# Patient Record
Sex: FEMALE | Race: ASIAN | NOT HISPANIC OR LATINO | URBAN - METROPOLITAN AREA
[De-identification: names, ages, dates, MRNs, and addresses within clinical notes are randomized per-mention and may not be internally consistent; named-entity substitution may affect disease eponyms.]

---

## 2024-08-09 ENCOUNTER — OFFICE VISIT (OUTPATIENT)
Dept: URBAN - METROPOLITAN AREA CLINIC 35 | Facility: CLINIC | Age: 39
End: 2024-08-09
Payer: COMMERCIAL

## 2024-08-09 ENCOUNTER — DASHBOARD ENCOUNTERS (OUTPATIENT)
Age: 39
End: 2024-08-09

## 2024-08-09 ENCOUNTER — LAB OUTSIDE AN ENCOUNTER (OUTPATIENT)
Dept: URBAN - METROPOLITAN AREA CLINIC 35 | Facility: CLINIC | Age: 39
End: 2024-08-09

## 2024-08-09 VITALS
WEIGHT: 114 LBS | HEIGHT: 63 IN | HEART RATE: 66 BPM | DIASTOLIC BLOOD PRESSURE: 56 MMHG | SYSTOLIC BLOOD PRESSURE: 94 MMHG | BODY MASS INDEX: 20.2 KG/M2 | OXYGEN SATURATION: 98 %

## 2024-08-09 DIAGNOSIS — R79.89 ELEVATED FERRITIN: ICD-10-CM

## 2024-08-09 DIAGNOSIS — E80.4 GILBERT'S SYNDROME: ICD-10-CM

## 2024-08-09 PROBLEM — 166643006: Status: ACTIVE | Noted: 2024-08-09

## 2024-08-09 PROBLEM — 27503000: Status: ACTIVE | Noted: 2024-08-09

## 2024-08-09 PROCEDURE — 99204 OFFICE O/P NEW MOD 45 MIN: CPT | Performed by: INTERNAL MEDICINE

## 2024-08-09 NOTE — HPI-ABNORMAL LABS
39 year old female patient presents today for a consultation of elevated liver enzymes. She admits any previous history of elevated liver enzymes. Most recent labs were performed 06/17/2024, ordered by Dr. Radha Campos , with finding listed below. Patient presents for abnormal labs . Patient denies any associated symptoms .      Outside Labs  (06/17/2024) CBC: All values within normal range  CMP: CO2 20 L , Bilirubin Total 1.9 H All other values within normal range .  Iron Total 173 H Sat % 51 H  Ferritin 399 H  TSH 1.69 Normal  (06/29/2023)  Bilirubin total 1.7 H Bilirubin Direct 0.3 H Bilirubin Indirect 1.4 H Ferritin 204 H [facility] Choice Screen  NEG

## 2024-08-26 LAB — HEREDITARY HEMOCHROMATOSIS DNA MUT: (no result)

## 2024-09-04 ENCOUNTER — WEB ENCOUNTER (OUTPATIENT)
Dept: URBAN - METROPOLITAN AREA CLINIC 35 | Facility: CLINIC | Age: 39
End: 2024-09-04

## 2024-09-06 ENCOUNTER — OFFICE VISIT (OUTPATIENT)
Dept: URBAN - METROPOLITAN AREA CLINIC 35 | Facility: CLINIC | Age: 39
End: 2024-09-06

## 2024-09-06 NOTE — HPI-ABNORMAL LABS
Patient presents for follow up of labs and imaging . pt had labs and u/s completed since last visit with results documented below . Patient admits /denies any associated symptoms .  Abdominal U/S (08.21.24) IMPRESSION: 1. Echogenic right hepatic lobe lesion measuring 3.3 cm in diameter and heterogeneous appearing left hepatic lobe lesion measuring 2.8 cm. Findings are nonspecific, correlate with abdominal MRI with and without contrast for further characterization. 2. No evidence of biliary obstruction, the gallbladder is normal, the CBD is not dilated.     Of last visit (08/09/2024) 39 year old female patient presents today for a consultation of elevated liver enzymes. She admits any previous history of elevated liver enzymes. Most recent labs were performed 06/17/2024, ordered by Dr. Radha Campos , with finding listed below. Patient presents for abnormal labs . Patient denies any associated symptoms .      Outside Labs  (06/17/2024) CBC: All values within normal range  CMP: CO2 20 L , Bilirubin Total 1.9 H All other values within normal range .  Iron Total 173 H Sat % 51 H  Ferritin 399 H  TSH 1.69 Normal  (06/29/2023)  Bilirubin total 1.7 H Bilirubin Direct 0.3 H Bilirubin Indirect 1.4 H Ferritin 204 H [facility] Choice Screen  NEG

## 2024-09-11 ENCOUNTER — LAB OUTSIDE AN ENCOUNTER (OUTPATIENT)
Dept: URBAN - METROPOLITAN AREA CLINIC 35 | Facility: CLINIC | Age: 39
End: 2024-09-11

## 2024-09-11 ENCOUNTER — OFFICE VISIT (OUTPATIENT)
Dept: URBAN - METROPOLITAN AREA CLINIC 35 | Facility: CLINIC | Age: 39
End: 2024-09-11
Payer: COMMERCIAL

## 2024-09-11 VITALS
BODY MASS INDEX: 20.55 KG/M2 | WEIGHT: 116 LBS | OXYGEN SATURATION: 98 % | DIASTOLIC BLOOD PRESSURE: 58 MMHG | HEART RATE: 69 BPM | HEIGHT: 63 IN | SYSTOLIC BLOOD PRESSURE: 100 MMHG

## 2024-09-11 DIAGNOSIS — R93.2 ABNORMAL LIVER ULTRASOUND: ICD-10-CM

## 2024-09-11 DIAGNOSIS — E80.4 GILBERT'S SYNDROME: ICD-10-CM

## 2024-09-11 PROCEDURE — 99213 OFFICE O/P EST LOW 20 MIN: CPT | Performed by: INTERNAL MEDICINE

## 2024-09-11 RX ORDER — MINOXIDIL 2.5 MG/1
1 TABLET TABLET ORAL TWICE A DAY
Status: ACTIVE | COMMUNITY

## 2024-09-11 NOTE — HPI-ABNORMAL LABS
Patient presents for follow up of labs and imaging . pt had labs and u/s completed since last visit with results documented below . Patient denies any associated symptoms .  Pt denies any new concerns   Abdominal U/S (08.21.24)  IMPRESSION: 1. Echogenic right hepatic lobe lesion measuring 3.3 cm in diameter and heterogeneous appearing left hepatic lobe lesion measuring 2.8 cm. Findings are nonspecific, correlate with abdominal MRI with and without contrast for further characterization. 2. No evidence of biliary obstruction, the gallbladder is normal, the CBD is not dilated.     Of last visit (08/09/2024) 39 year old female patient presents today for a consultation of elevated liver enzymes. She admits any previous history of elevated liver enzymes. Most recent labs were performed 06/17/2024, ordered by Dr. Radha Campos , with finding listed below. Patient presents for abnormal labs . Patient denies any associated symptoms .      Outside Labs  (06/17/2024) CBC: All values within normal range  CMP: CO2 20 L , Bilirubin Total 1.9 H All other values within normal range .  Iron Total 173 H Sat % 51 H  Ferritin 399 H  TSH 1.69 Normal HFE NEGATIVE (06/29/2023)  Bilirubin total 1.7 H Bilirubin Direct 0.3 H Bilirubin Indirect 1.4 H Ferritin 204 H [facility] Choice Screen  NEG

## 2024-10-04 ENCOUNTER — OFFICE VISIT (OUTPATIENT)
Dept: URBAN - METROPOLITAN AREA CLINIC 35 | Facility: CLINIC | Age: 39
End: 2024-10-04

## 2024-10-11 ENCOUNTER — LAB OUTSIDE AN ENCOUNTER (OUTPATIENT)
Dept: URBAN - METROPOLITAN AREA CLINIC 35 | Facility: CLINIC | Age: 39
End: 2024-10-11

## 2024-10-11 ENCOUNTER — OFFICE VISIT (OUTPATIENT)
Dept: URBAN - METROPOLITAN AREA CLINIC 35 | Facility: CLINIC | Age: 39
End: 2024-10-11
Payer: COMMERCIAL

## 2024-10-11 VITALS
WEIGHT: 117 LBS | HEART RATE: 76 BPM | HEIGHT: 63 IN | SYSTOLIC BLOOD PRESSURE: 104 MMHG | OXYGEN SATURATION: 99 % | DIASTOLIC BLOOD PRESSURE: 66 MMHG | BODY MASS INDEX: 20.73 KG/M2

## 2024-10-11 DIAGNOSIS — R93.2 ABNORMAL FINDING ON IMAGING OF LIVER: ICD-10-CM

## 2024-10-11 PROBLEM — 442684004: Status: ACTIVE | Noted: 2024-10-11

## 2024-10-11 PROCEDURE — 99213 OFFICE O/P EST LOW 20 MIN: CPT | Performed by: INTERNAL MEDICINE

## 2024-10-11 RX ORDER — MINOXIDIL 2.5 MG/1
1 TABLET TABLET ORAL TWICE A DAY
Status: ACTIVE | COMMUNITY

## 2024-10-11 NOTE — HPI-ABNORMAL LABS
Patient presents for follow up of MRI . patient had MRI completed 10.02 notd below . Denies abdominal pain , nausea or vomiting .  Patient denies any further concerns at this time .  (10.02.2024)   MRI REPORT IMPRESSION:  1. Hyperenhancing 1.5 cm lesion in hepatic segment 4A. Differential diagnosis includes focal nodular hyperplasia and hepatic adenoma. MRI abdomen with and without contrast (Eovist) is recommended. 2. Hemangioma in hepatic segment 5/6   Of last visit (09/11/2024) Patient presents for follow up of labs and imaging . pt had labs and u/s completed since last visit with results documented below . Patient denies any associated symptoms .  Pt denies any new concerns   Abdominal U/S (08.21.24)  IMPRESSION: 1. Echogenic right hepatic lobe lesion measuring 3.3 cm in diameter and heterogeneous appearing left hepatic lobe lesion measuring 2.8 cm. Findings are nonspecific, correlate with abdominal MRI with and without contrast for further characterization. 2. No evidence of biliary obstruction, the gallbladder is normal, the CBD is not dilated.     Of last visit (08/09/2024) 39 year old female patient presents today for a consultation of elevated liver enzymes. She admits any previous history of elevated liver enzymes. Most recent labs were performed 06/17/2024, ordered by Dr. Radha Campos , with finding listed below. Patient presents for abnormal labs . Patient denies any associated symptoms .      Outside Labs  (06/17/2024) CBC: All values within normal range  CMP: CO2 20 L , Bilirubin Total 1.9 H All other values within normal range .  Iron Total 173 H Sat % 51 H  Ferritin 399 H  TSH 1.69 Normal HFE NEGATIVE (06/29/2023)  Bilirubin total 1.7 H Bilirubin Direct 0.3 H Bilirubin Indirect 1.4 H Ferritin 204 H [facility] Choice Screen  NEG

## 2024-10-28 ENCOUNTER — TELEPHONE ENCOUNTER (OUTPATIENT)
Dept: URBAN - METROPOLITAN AREA CLINIC 36 | Facility: CLINIC | Age: 39
End: 2024-10-28

## 2024-11-08 ENCOUNTER — OFFICE VISIT (OUTPATIENT)
Dept: URBAN - METROPOLITAN AREA CLINIC 35 | Facility: CLINIC | Age: 39
End: 2024-11-08

## 2024-11-08 NOTE — HPI-ABNORMAL LABS
39 year old female patient presents for follow up. She had MRI completed on ___, with results noted below. Admits/denies abdominal pain, nausea, or vomiting. She denies any further concerns at this time.  MRI Liver w/ Eovist  IMPRESSION:    Of last visit (10/11/2024) Patient presents for follow up of MRI . patient had MRI completed 10.02 notd below . Denies abdominal pain , nausea or vomiting .  Patient denies any further concerns at this time .  (10.02.2024)   MRI REPORT IMPRESSION:  1. Hyperenhancing 1.5 cm lesion in hepatic segment 4A. Differential diagnosis includes focal nodular hyperplasia and hepatic adenoma. MRI abdomen with and without contrast (Eovist) is recommended. 2. Hemangioma in hepatic segment 5/6   Of last visit (09/11/2024) Patient presents for follow up of labs and imaging . pt had labs and u/s completed since last visit with results documented below . Patient denies any associated symptoms .  Pt denies any new concerns   Abdominal U/S (08.21.24)  IMPRESSION: 1. Echogenic right hepatic lobe lesion measuring 3.3 cm in diameter and heterogeneous appearing left hepatic lobe lesion measuring 2.8 cm. Findings are nonspecific, correlate with abdominal MRI with and without contrast for further characterization. 2. No evidence of biliary obstruction, the gallbladder is normal, the CBD is not dilated.     Of last visit (08/09/2024) 39 year old female patient presents today for a consultation of elevated liver enzymes. She admits any previous history of elevated liver enzymes. Most recent labs were performed 06/17/2024, ordered by Dr. Radha Campos , with finding listed below. Patient presents for abnormal labs . Patient denies any associated symptoms .      Outside Labs  (06/17/2024) CBC: All values within normal range  CMP: CO2 20 L , Bilirubin Total 1.9 H All other values within normal range .  Iron Total 173 H Sat % 51 H  Ferritin 399 H  TSH 1.69 Normal HFE NEGATIVE (06/29/2023)  Bilirubin total 1.7 H Bilirubin Direct 0.3 H Bilirubin Indirect 1.4 H Ferritin 204 H [facility] Choice Screen  NEG

## 2025-02-18 ENCOUNTER — LAB OUTSIDE AN ENCOUNTER (OUTPATIENT)
Dept: URBAN - METROPOLITAN AREA CLINIC 36 | Facility: CLINIC | Age: 40
End: 2025-02-18

## 2025-02-18 ENCOUNTER — TELEPHONE ENCOUNTER (OUTPATIENT)
Dept: URBAN - METROPOLITAN AREA CLINIC 36 | Facility: CLINIC | Age: 40
End: 2025-02-18

## 2025-02-18 PROBLEM — 300331000: Status: ACTIVE | Noted: 2025-02-18
